# Patient Record
Sex: MALE
[De-identification: names, ages, dates, MRNs, and addresses within clinical notes are randomized per-mention and may not be internally consistent; named-entity substitution may affect disease eponyms.]

---

## 2018-12-21 ENCOUNTER — HOSPITAL ENCOUNTER (EMERGENCY)
Dept: HOSPITAL 31 - C.ER | Age: 33
Discharge: HOME | End: 2018-12-21
Payer: COMMERCIAL

## 2018-12-21 VITALS — BODY MASS INDEX: 22.4 KG/M2

## 2018-12-21 VITALS — SYSTOLIC BLOOD PRESSURE: 130 MMHG | DIASTOLIC BLOOD PRESSURE: 77 MMHG | TEMPERATURE: 98.2 F | HEART RATE: 73 BPM

## 2018-12-21 VITALS — RESPIRATION RATE: 20 BRPM

## 2018-12-21 VITALS — OXYGEN SATURATION: 99 %

## 2018-12-21 DIAGNOSIS — S20.211A: Primary | ICD-10-CM
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
## 2018-12-21 LAB
ALBUMIN SERPL-MCNC: 4.8 G/DL (ref 3.5–5)
ALBUMIN/GLOB SERPL: 1.7 {RATIO} (ref 1–2.1)
ALT SERPL-CCNC: 75 U/L (ref 21–72)
AST SERPL-CCNC: 57 U/L (ref 17–59)
BASOPHILS # BLD AUTO: 0 K/UL (ref 0–0.2)
BASOPHILS NFR BLD: 0.3 % (ref 0–2)
BUN SERPL-MCNC: 16 MG/DL (ref 9–20)
CALCIUM SERPL-MCNC: 9.7 MG/DL (ref 8.6–10.4)
EOSINOPHIL # BLD AUTO: 0 K/UL (ref 0–0.7)
EOSINOPHIL NFR BLD: 0.3 % (ref 0–4)
ERYTHROCYTE [DISTWIDTH] IN BLOOD BY AUTOMATED COUNT: 12.8 % (ref 11.5–14.5)
GFR NON-AFRICAN AMERICAN: > 60
HGB BLD-MCNC: 14.7 G/DL (ref 12–18)
LYMPHOCYTES # BLD AUTO: 1.1 K/UL (ref 1–4.3)
LYMPHOCYTES NFR BLD AUTO: 14.4 % (ref 20–40)
MCH RBC QN AUTO: 32.9 PG (ref 27–31)
MCHC RBC AUTO-ENTMCNC: 34.2 G/DL (ref 33–37)
MCV RBC AUTO: 96.2 FL (ref 80–94)
MONOCYTES # BLD: 0.5 K/UL (ref 0–0.8)
MONOCYTES NFR BLD: 7 % (ref 0–10)
NEUTROPHILS # BLD: 5.7 K/UL (ref 1.8–7)
NEUTROPHILS NFR BLD AUTO: 78 % (ref 50–75)
NRBC BLD AUTO-RTO: 0 % (ref 0–2)
PLATELET # BLD: 224 K/UL (ref 130–400)
PMV BLD AUTO: 10.1 FL (ref 7.2–11.7)
RBC # BLD AUTO: 4.46 MIL/UL (ref 4.4–5.9)
WBC # BLD AUTO: 7.4 K/UL (ref 4.8–10.8)

## 2018-12-21 PROCEDURE — 71101 X-RAY EXAM UNILAT RIBS/CHEST: CPT

## 2018-12-21 PROCEDURE — 96374 THER/PROPH/DIAG INJ IV PUSH: CPT

## 2018-12-21 PROCEDURE — 80053 COMPREHEN METABOLIC PANEL: CPT

## 2018-12-21 PROCEDURE — 85025 COMPLETE CBC W/AUTO DIFF WBC: CPT

## 2018-12-21 PROCEDURE — 96361 HYDRATE IV INFUSION ADD-ON: CPT

## 2018-12-21 PROCEDURE — 84443 ASSAY THYROID STIM HORMONE: CPT

## 2018-12-21 PROCEDURE — 93005 ELECTROCARDIOGRAM TRACING: CPT

## 2018-12-21 PROCEDURE — 99284 EMERGENCY DEPT VISIT MOD MDM: CPT

## 2018-12-21 NOTE — C.PDOC
History Of Present Illness


34 y/o male presents to the ER complaining of intermittent pain in the right 

lateral ribs which has gradually worsened over the past 2 weeks.  Patient states

that he was working on a roof when he leaned over a pipe.  Patient reports that 

the pipe hit him in the right rib region. He also admits to some chills and 

generalized weakness; states I don't "feel like myself."  Patient denies fever, 

SOB, nausea, vomiting, abdominal pain.


Time Seen by Provider: 18 14:42


Chief Complaint (Nursing): Rib Injury


History Per: Patient


History/Exam Limitations: no limitations


Onset/Duration Of Symptoms: Days


Current Symptoms Are (Timing): Still Present


Severity: Moderate





Past Medical History


Reviewed: Historical Data, Nursing Documentation, Vital Signs


Vital Signs: 





                                Last Vital Signs











Temp  98.5 F   18 14:42


 


Pulse  83   18 14:42


 


Resp  18   18 14:42


 


BP  127/79   18 14:42


 


Pulse Ox  99   18 14:42














- Medical History


PMH: No Chronic Diseases


Surgical History: No Surg Hx


Family History: States: No Known Family Hx





- Social History


Hx Alcohol Use: Yes


Hx Substance Use: Yes





- Immunization History


Hx Tetanus Toxoid Vaccination: No


Hx Influenza Vaccination: No


Hx Pneumococcal Vaccination: No





Review Of Systems


Except As Marked, All Systems Reviewed And Found Negative.


Constitutional: Positive for: Chills, Weakness.  Negative for: Fever


Cardiovascular: Positive for: Other (rib pain).  Negative for: Chest Pain, 

Palpitations


Respiratory: Negative for: Cough, Shortness of Breath


Gastrointestinal: Negative for: Nausea, Vomiting, Abdominal Pain





Physical Exam





- Physical Exam


Appears: Well, Non-toxic, No Acute Distress


Skin: Normal Color, Warm, Dry


Eye(s): bilateral: Normal Inspection


Oral Mucosa: Moist


Neck: Supple


Chest: Symmetrical, Tenderness (mild tenderness to palpaton over right lateral 

rib at mid-axillary line (approx rib 9)), Other (mild contusion to right lateral

ninth rib at mid-axillary line)


Cardiovascular: Rhythm Regular


Respiratory: Normal Breath Sounds, No Rales, No Rhonchi, No Wheezing


Gastrointestinal/Abdominal: Normal Exam, Bowel Sounds, Soft, No Tenderness


Neurological/Psych: Oriented x3





ED Course And Treatment





- Laboratory Results


Result Diagrams: 


                                 18 15:24





                                 18 15:24


ECG: Interpreted By Me, Viewed By Me


ECG Rhythm: Sinus Bradycardia


Interpretation Of ECG: Sinus Bradycardia with normal axises and no acute ST/T 

wave changes


Rate From EC


O2 Sat by Pulse Oximetry: 99 (RA)


Pulse Ox Interpretation: Normal





- Other Rad


  ** X-Ray- Chest w/Ribs


X-Ray: Viewed By Me, Read By Radiologist


Interpretation: Date of service:  2018.  PROCEDURE:  Radiographs of the 

Chest and Right Ribs.  HISTORY:  Right rib pain, injury.  COMPARISON:  None 

available.  TECHNIQUE:  Frontal radiograph of the chest and multiple oblique 

radiographs of the right ribs were obtained.  FINDINGS:  RIGHT RIBS:  No acute 

rib fracture or focal lesion visualized.  LUNGS:  The lungs are well inflated 

and clear.  PLEURA:  No pneumothorax or pleural fluid.  CARDIOVASCULAR:  Normal 

cardiac size. No pulmonary vascular congestion.  No aortic atherosclerotic c

alcification present.  OTHER FINDINGS:  None.  IMPRESSION:  No acute rib 

fracture.  Clear lungs.


Progress Note: Blood work, EKG, CXR ordered and reviewed.   Patient given IV NS 

bolus, IV toradol.


Reevaluation Time: 16:40


Reassessment Condition: Improved (On reassessment, patient is resting 

comfortably and states he feels better.  Blood work, EKG unremarkable.  Patient 

given Rx for Naprosyn, and was instructed to follow up with PMD/clinic in 1-2 

days.  He understands he should return to ED if symptoms worsen.)





Disposition


Counseled Patient/Family Regarding: Studies Performed, Diagnosis, Need For 

Followup





- Disposition


Referrals: 


[facility] [Outside]


Disposition: HOME/ ROUTINE


Disposition Time: 16:40


Condition: STABLE


Additional Instructions: 


FOLLOW UP WITH YOUR DOCTOR/CLINIC IN 1-2 DAYS





USE MEDICATION AS NEEDED FOR PAIN





RETURN TO ER IF SYMPTOMS WORSEN 


Prescriptions: 


Naproxen [Naprosyn] 1 tab PO BID PRN #25 tab


 PRN Reason: Pain


Instructions:  Bruised Rib (DC)


Forms:  [facility] (English), General Discharge Instructions


Print Language: ENGLISH





- Clinical Impression


Clinical Impression: 


 Contusion of rib on right side








- Scribe Statement


The provider has reviewed the documentation as recorded by the Almaz Escalera


Provider Attestation: 





All medical record entries made by the Almaz were at my direction and 

personally dictated by me. I have reviewed the chart and agree that the record 

accurately reflects my personal performance of the history, physical exam, 

medical decision making, and the department course for this patient. I have also

 personally directed, reviewed, and agree with the discharge instructions and 

disposition.

## 2018-12-21 NOTE — RAD
Date of service: 



12/21/2018



PROCEDURE:  Radiographs of the Chest and Right Ribs.



HISTORY:

Right rib pain, injury 



COMPARISON:

None available. 



TECHNIQUE:

Frontal radiograph of the chest and multiple oblique radiographs of 

the right ribs were obtained.



FINDINGS:



RIGHT RIBS:

No acute rib fracture or focal lesion visualized.



LUNGS:

The lungs are well inflated and clear.



PLEURA:

No pneumothorax or pleural fluid.



CARDIOVASCULAR:

Normal cardiac size. No pulmonary vascular congestion. 



No aortic atherosclerotic calcification present



OTHER FINDINGS:

None.



IMPRESSION:

No acute rib fracture. 



Clear lungs.

## 2018-12-24 NOTE — CARD
--------------- APPROVED REPORT --------------





Date of service: 12/21/2018



EKG Measurement

Heart [id number]

[id number]

[id number]

[id number]

[id number]



<Conclusion>

Sinus bradycardia

Nonspecific ST abnormality

Abnormal ECG